# Patient Record
Sex: FEMALE | Race: WHITE | NOT HISPANIC OR LATINO | Employment: FULL TIME | URBAN - METROPOLITAN AREA
[De-identification: names, ages, dates, MRNs, and addresses within clinical notes are randomized per-mention and may not be internally consistent; named-entity substitution may affect disease eponyms.]

---

## 2019-04-27 ENCOUNTER — OFFICE VISIT (OUTPATIENT)
Dept: URGENT CARE | Facility: URGENT CARE | Age: 39
End: 2019-04-27
Payer: COMMERCIAL

## 2019-04-27 VITALS
HEART RATE: 107 BPM | TEMPERATURE: 102.6 F | BODY MASS INDEX: 26.14 KG/M2 | SYSTOLIC BLOOD PRESSURE: 123 MMHG | OXYGEN SATURATION: 100 % | WEIGHT: 177 LBS | DIASTOLIC BLOOD PRESSURE: 73 MMHG

## 2019-04-27 DIAGNOSIS — R50.9 FEVER IN ADULT: ICD-10-CM

## 2019-04-27 DIAGNOSIS — J06.9 VIRAL URI: Primary | ICD-10-CM

## 2019-04-27 LAB
FLUAV+FLUBV AG SPEC QL: NEGATIVE
FLUAV+FLUBV AG SPEC QL: NEGATIVE
SPECIMEN SOURCE: NORMAL

## 2019-04-27 PROCEDURE — 99203 OFFICE O/P NEW LOW 30 MIN: CPT | Performed by: PHYSICIAN ASSISTANT

## 2019-04-27 PROCEDURE — 87804 INFLUENZA ASSAY W/OPTIC: CPT | Performed by: FAMILY MEDICINE

## 2019-04-27 RX ORDER — AMITRIPTYLINE HYDROCHLORIDE 10 MG/1
20 TABLET ORAL DAILY
Refills: 4 | COMMUNITY
Start: 2019-03-26

## 2019-04-28 NOTE — PROGRESS NOTES
SUBJECTIVE:    Sofia Diaz to  today for evaluation of  fever (home T-Max 101.3 today), chills, nasal congestion, clear rhinorrhea and generalized body aches. No cough.     ROS:     HEENT: Positive nasal congestion with clear rhinorrhea.   RESP: Denies any cough, wheezing or SOB.   Denies any hx of asthma or RAD.   GI: Denies any N/V/D. No abdominal pain. Normal BM's  SKIN: Denies rash  NEURO:Denies any HA, neck stiffness or lethargy.     PMHX: Migraine HA     Past Medical History:   Diagnosis Date     Migraines        Current Outpatient Medications   Medication     amitriptyline (ELAVIL) 10 MG tablet     MAGNESIUM OXIDE PO     Prenatal Vit-Fe Fumarate-FA (PRENATAL MULTIVITAMIN  PLUS IRON) 27-0.8 MG TABS     No current facility-administered medications for this visit.        Allergies   Allergen Reactions     Lactose      PN: LW FI1: nka     Penicillins Hives       OBJECTIVE:  /73   Pulse 107   Temp 102.6  F (39.2  C) (Oral)   Wt 80.3 kg (177 lb)   SpO2 100%   Breastfeeding? No   BMI 26.14 kg/m      General appearance: alert and no apparent distress  Skin color is pink and without rash.  HEENT:   Conjunctiva not injected.  Sclera clear.  Left TM is normal: no effusions, no erythema, and normal landmarks.  Right TM is normal: no effusions, no erythema, and normal landmarks.  Nasal mucosa is congested  Oropharyngeal exam is positive for post-nasal drip. Uvula mid-line. No erythema.  No plaque, exudate, lesions, or ulcers.   Neck is supple, FROM. No pain or stiffness with ROM. No adenopathy  CARDIAC:NORMAL - regular rate and rhythm without murmur.  RESP: Normal - CTA without rales, rhonchi, or wheezing.  ABDOMEN: Abdomen soft, non-tender. BS normal. No masses, organomegaly  NEURO: Alert and oriented.  Normal speech and mentation.  CN II/XII grossly intact.  Gait within normal limits.        LAB:     Results for orders placed or performed in visit on 04/27/19   Influenza A/B antigen   Result  "Value Ref Range    Influenza A/B Agn Specimen Nasal     Influenza A Negative NEG^Negative    Influenza B Negative NEG^Negative           ASSESSMENT/PLAN:    (J06.9) Viral URI  (primary encounter diagnosis)  MDM: Acute onset nasal congestion, clear rhinorrhea, generalized body aches and fever x 2 days duration. Influenza testing negative here today. No ST and patient declines RST. No evidence of secondary bacterial infection.     Plan: Push fluids. Monitor fever and follow-up if increased fever or if fever fails to resolve over next 24-48 hours. Follow-up with PCP if sxs change, worsen or fail to resolve with home comfort care measures over the next 5-7 days.      In addition to the above, viral URI \"red flag\" signs and sxs are reviewed with patient. AVS declined.   Patient verbalizes understanding of and agrees to the above plan.       (R50.9) Fever  Plan: Influenza A/B antigen                "

## 2020-07-15 ENCOUNTER — OFFICE VISIT (OUTPATIENT)
Dept: URGENT CARE | Facility: URGENT CARE | Age: 40
End: 2020-07-15
Payer: COMMERCIAL

## 2020-07-15 VITALS
TEMPERATURE: 98.4 F | OXYGEN SATURATION: 100 % | HEART RATE: 81 BPM | WEIGHT: 176 LBS | DIASTOLIC BLOOD PRESSURE: 77 MMHG | BODY MASS INDEX: 25.99 KG/M2 | SYSTOLIC BLOOD PRESSURE: 113 MMHG

## 2020-07-15 DIAGNOSIS — J02.9 SORE THROAT: Primary | ICD-10-CM

## 2020-07-15 LAB
DEPRECATED S PYO AG THROAT QL EIA: NEGATIVE
SPECIMEN SOURCE: NORMAL

## 2020-07-15 PROCEDURE — 40001204 ZZHCL STATISTIC STREP A RAPID: Performed by: NURSE PRACTITIONER

## 2020-07-15 PROCEDURE — 99213 OFFICE O/P EST LOW 20 MIN: CPT | Performed by: NURSE PRACTITIONER

## 2020-07-15 PROCEDURE — 87651 STREP A DNA AMP PROBE: CPT | Performed by: NURSE PRACTITIONER

## 2020-07-15 ASSESSMENT — ENCOUNTER SYMPTOMS
HEADACHES: 0
RHINORRHEA: 1
VOMITING: 0
CHILLS: 0
FEVER: 0
FATIGUE: 1
DIAPHORESIS: 0
VOICE CHANGE: 0
SHORTNESS OF BREATH: 0
SLEEP DISTURBANCE: 0
MYALGIAS: 0
LIGHT-HEADEDNESS: 0
SORE THROAT: 1
COUGH: 0
TROUBLE SWALLOWING: 0
CHEST TIGHTNESS: 0
NAUSEA: 0
WHEEZING: 0
ADENOPATHY: 1

## 2020-07-15 NOTE — PROGRESS NOTES
Chief Complaint   Patient presents with     Urgent Care     Pharyngitis     start 1-2 weeks sx sore throat hx daughter tested positive for strep today tx none      SUBJECTIVE:  Sofia Diaz is a 40 year old female presenting with a chief complaint of a sore throat, slight runny nose and post nasal drip.  Onset of symptoms was 2 week(s) ago.  Course of illness: gradual onset, coming and going.  Severity: mild  Current and Associated symptoms: none.  Treatment measures tried include: None tried.  Predisposing factors include: daughter just tested positive for strep yesterday. Patient does not recall a history of strep throat.    Past Medical History:   Diagnosis Date     Migraines      amitriptyline (ELAVIL) 10 MG tablet, Take 20 mg by mouth daily  levonorgestrel (MIRENA, 52 MG,) 20 MCG/24HR IUD, place by Intrauterine route  every 5 years  MAGNESIUM OXIDE PO,   Prenatal Vit-Fe Fumarate-FA (PRENATAL MULTIVITAMIN  PLUS IRON) 27-0.8 MG TABS, Take 1 tablet by mouth daily    No current facility-administered medications on file prior to visit.     Social History     Tobacco Use     Smoking status: Never Smoker     Smokeless tobacco: Never Used   Substance Use Topics     Alcohol use: Yes     Comment: rare     Allergies   Allergen Reactions     Lactose      PN: LW FI1: nka     Penicillins Hives     Review of Systems   Constitutional: Positive for fatigue (not new). Negative for chills, diaphoresis and fever.   HENT: Positive for postnasal drip, rhinorrhea and sore throat. Negative for congestion, ear pain, trouble swallowing and voice change.    Respiratory: Negative for cough, chest tightness, shortness of breath and wheezing.    Gastrointestinal: Negative for nausea and vomiting.   Musculoskeletal: Negative for myalgias.   Skin: Negative for rash.   Neurological: Negative for light-headedness and headaches.   Hematological: Positive for adenopathy.   Psychiatric/Behavioral: Negative for sleep disturbance.     OBJECTIVE:    /77   Pulse 81   Temp 98.4  F (36.9  C)   Wt 79.8 kg (176 lb)   SpO2 100%   BMI 25.99 kg/m       Physical Exam  Constitutional:       General: She is not in acute distress.     Appearance: She is well-developed. She is not ill-appearing, toxic-appearing or diaphoretic.   HENT:      Head: Normocephalic and atraumatic.      Nose: Nose normal.      Mouth/Throat:      Mouth: Mucous membranes are moist.      Pharynx: Posterior oropharyngeal erythema (slight) present. No oropharyngeal exudate.      Comments: Tonsils flat almost appear absent.  Eyes:      Conjunctiva/sclera: Conjunctivae normal.      Pupils: Pupils are equal, round, and reactive to light.   Neck:      Musculoskeletal: Normal range of motion and neck supple.   Cardiovascular:      Rate and Rhythm: Normal rate.   Pulmonary:      Effort: Pulmonary effort is normal. No respiratory distress.   Musculoskeletal: Normal range of motion.   Lymphadenopathy:      Cervical: Cervical adenopathy present.   Skin:     General: Skin is warm.      Capillary Refill: Capillary refill takes less than 2 seconds.      Findings: No erythema or rash.   Neurological:      General: No focal deficit present.      Mental Status: She is alert and oriented to person, place, and time.   Psychiatric:         Mood and Affect: Mood normal.         Behavior: Behavior normal.       Rapid Strep test is negative; await throat culture results.    ASSESSMENT:    ICD-10-CM    1. Sore throat  J02.9 Streptococcus A Rapid Scr w Reflx to PCR     Group A Streptococcus PCR Throat Swab     PLAN:   Patient Instructions   Rapid strep test today is negative.  Likely seasonal allergic post nasal drip or viral pharyngitis that should subside.  Your throat culture is pending. Urgent Care will call if positive results to start antibiotics at that time; No call if the culture is negative.  Patient wants to hold on covid testing for now. Advised oncare if this lingers and she decides on curbside  testing.  Drink plenty of fluids and rest.  May use salt water gargles- about 8 oz warm water with about 1 teaspoon salt  Sucrets and Cepacol spray are over the counter medications that numb the throat.  Over the counter pain relievers such as tylenol or ibuprofen may be used as needed.  Antihistamines such as zyrtec and benadryl for post nasal drip.   Honey has been shown to be helpful in cough management and is soothing to a sore throat. May add to lemon tea.  Please follow up with primary care provider if not improving, worsening or new symptoms.    Follow up with primary care provider with any problems, questions or concerns or if symptoms worsen or fail to improve. Patient agreed to plan and verbalized understanding.    TIFFANIE [facility] URGENT CARE COURTNEY

## 2020-07-15 NOTE — PATIENT INSTRUCTIONS
Rapid strep test today is negative.  Likely seasonal allergic post nasal drip or viral pharyngitis that should subside.  Your throat culture is pending. Urgent Care will call if positive results to start antibiotics at that time; No call if the culture is negative.  Patient wants to hold on covid testing for now. Advised oncare if this lingers and she decides on curbside testing.  Drink plenty of fluids and rest.  May use salt water gargles- about 8 oz warm water with about 1 teaspoon salt  Sucrets and Cepacol spray are over the counter medications that numb the throat.  Over the counter pain relievers such as tylenol or ibuprofen may be used as needed.  Antihistamines such as zyrtec and benadryl for post nasal drip.   Honey has been shown to be helpful in cough management and is soothing to a sore throat. May add to lemon tea.  Please follow up with primary care provider if not improving, worsening or new symptoms.

## 2020-07-16 LAB
SPECIMEN SOURCE: NORMAL
STREP GROUP A PCR: NOT DETECTED

## 2021-11-21 ENCOUNTER — HOSPITAL ENCOUNTER (EMERGENCY)
Facility: CLINIC | Age: 41
Discharge: HOME | End: 2021-11-21
Attending: EMERGENCY MEDICINE | Admitting: EMERGENCY MEDICINE
Payer: COMMERCIAL

## 2021-11-21 VITALS
BODY MASS INDEX: 26.66 KG/M2 | WEIGHT: 180 LBS | RESPIRATION RATE: 16 BRPM | SYSTOLIC BLOOD PRESSURE: 127 MMHG | DIASTOLIC BLOOD PRESSURE: 91 MMHG | OXYGEN SATURATION: 99 % | HEART RATE: 74 BPM | HEIGHT: 69 IN | TEMPERATURE: 98.4 F

## 2021-11-21 DIAGNOSIS — R33.8 ACUTE URINARY RETENTION: ICD-10-CM

## 2021-11-21 LAB
ALBUMIN UR-MCNC: NEGATIVE MG/DL
ANION GAP SERPL CALCULATED.3IONS-SCNC: 4 MMOL/L (ref 3–14)
APPEARANCE UR: CLEAR
BILIRUB UR QL STRIP: NEGATIVE
BUN SERPL-MCNC: 15 MG/DL (ref 7–30)
CALCIUM SERPL-MCNC: 8.6 MG/DL (ref 8.5–10.1)
CHLORIDE BLD-SCNC: 110 MMOL/L (ref 94–109)
CO2 SERPL-SCNC: 26 MMOL/L (ref 20–32)
COLOR UR AUTO: ABNORMAL
CREAT SERPL-MCNC: 0.88 MG/DL (ref 0.52–1.04)
GFR SERPL CREATININE-BSD FRML MDRD: 82 ML/MIN/1.73M2
GLUCOSE BLD-MCNC: 93 MG/DL (ref 70–99)
GLUCOSE UR STRIP-MCNC: NEGATIVE MG/DL
HCG UR QL: NEGATIVE
HGB UR QL STRIP: NEGATIVE
KETONES UR STRIP-MCNC: NEGATIVE MG/DL
LEUKOCYTE ESTERASE UR QL STRIP: NEGATIVE
MUCOUS THREADS #/AREA URNS LPF: PRESENT /LPF
NITRATE UR QL: NEGATIVE
PH UR STRIP: 6.5 [PH] (ref 5–7)
POTASSIUM BLD-SCNC: 4.2 MMOL/L (ref 3.4–5.3)
RBC URINE: 1 /HPF
SODIUM SERPL-SCNC: 140 MMOL/L (ref 133–144)
SP GR UR STRIP: 1.02 (ref 1–1.03)
UROBILINOGEN UR STRIP-MCNC: NORMAL MG/DL
WBC URINE: 0 /HPF

## 2021-11-21 PROCEDURE — 51702 INSERT TEMP BLADDER CATH: CPT

## 2021-11-21 PROCEDURE — 80048 BASIC METABOLIC PNL TOTAL CA: CPT | Performed by: EMERGENCY MEDICINE

## 2021-11-21 PROCEDURE — 99283 EMERGENCY DEPT VISIT LOW MDM: CPT

## 2021-11-21 PROCEDURE — 81025 URINE PREGNANCY TEST: CPT | Performed by: EMERGENCY MEDICINE

## 2021-11-21 PROCEDURE — 81001 URINALYSIS AUTO W/SCOPE: CPT | Performed by: EMERGENCY MEDICINE

## 2021-11-21 PROCEDURE — 36415 COLL VENOUS BLD VENIPUNCTURE: CPT | Performed by: EMERGENCY MEDICINE

## 2021-11-21 RX ORDER — NITROFURANTOIN 25; 75 MG/1; MG/1
100 CAPSULE ORAL 2 TIMES DAILY
Qty: 10 CAPSULE | Refills: 0 | Status: SHIPPED | OUTPATIENT
Start: 2021-11-21 | End: 2021-11-26

## 2021-11-21 ASSESSMENT — ENCOUNTER SYMPTOMS
ABDOMINAL PAIN: 0
DIFFICULTY URINATING: 1
FREQUENCY: 1
BACK PAIN: 0
HEMATURIA: 1
CONSTIPATION: 1
FEVER: 0
CHILLS: 0
ABDOMINAL DISTENTION: 0

## 2021-11-21 ASSESSMENT — MIFFLIN-ST. JEOR: SCORE: 1550.85

## 2021-11-21 NOTE — ED TRIAGE NOTES
Pt had bladder sling surgery on 11/12. Pt woke up this morning unable to urinate. Pt had a little difficulty yesterday as well. Pt having some pressure, denies pain.

## 2021-11-21 NOTE — ED PROVIDER NOTES
History   Chief Complaint:  Urinary Retention     The history is provided by the patient.      Sofia Diaz is a 41 year old female with history of overactive bladder and stress incontinence, s/p internal bladder sling placement on , who presents with increased urgency and difficulty urinating that started last night and increased this morning to the point where she was only able to void a few dribbles of urine. Reports some abdominal pressure, but denies significant abdominal pain or distension. Reports hematuria, but believes this is secondary to her recent bladder surgery. She has chronic constipation. Denies fever, chills and low back pain. No radiation of pain down legs nor associated leg weakness/numbness. She notes that she was recovering well following the surgery and was asymptomatic until last night.    Review of Systems   Constitutional: Negative for chills and fever.   Gastrointestinal: Positive for constipation (chronic). Negative for abdominal distention and abdominal pain.   Genitourinary: Positive for decreased urine volume, difficulty urinating, frequency, hematuria and urgency.   Musculoskeletal: Negative for back pain.   All other systems reviewed and are negative.    Allergies:  Lactose  Penicillins  Sulfa Antibiotics  Tetracycline    Medications:  Myrbetriq  Detrol LA  Prilosec  Elavil    Past Medical History:     Overactive bladder  Stress incontinence  Migraines  Simple goiter    Hemorrhage of rectum and anus  Hemorrhoids  Schatzki's ring  TMJ syndrome  Basal cell carcinoma  Eustachian tube dysfunction    Past Surgical History:     section  DaVinci laparoscopic cholecystectomy     Family History:    Multiple: hypertension, basal cell carcinoma  Sister: anxiety, depression    Social History:  Presents to the ED alone.  Follows up with Dr. James Grant at [facility] in [place].    Physical Exam     Patient Vitals for the past 24 hrs:   BP Temp Temp src Pulse Resp SpO2  "Height Weight   11/21/21 1100 (!) 127/91 -- -- 74 -- 99 % -- --   11/21/21 1045 (!) 120/91 -- -- 71 -- 97 % -- --   11/21/21 1030 (!) 119/96 -- -- 76 -- 98 % -- --   11/21/21 1015 110/82 -- -- 75 -- 99 % -- --   11/21/21 0919 (!) 161/106 98.4  F (36.9  C) Oral 94 16 98 % 1.753 m (5' 9\") 81.6 kg (180 lb)       Physical Exam  General:              Well-nourished              Speaking in full sentences  Eyes:              Conjunctiva without injection or scleral icterus  ENT:              Moist mucous membranes              Nares patent              Pinnae normal  Neck:              Full ROM              No stiffness appreciated  Resp:              Lungs CTAB              No crackles, wheezing or audible rubs              Good air movement  CV:                    Normal rate, regular rhythm              S1 and S2 present              No murmur, gallop or rub  GI:              BS present              Abdomen soft without distention              Non-tender to light and deep palpation              No guarding or rebound tenderness  Skin:              Warm, dry, well perfused              No rashes or open wounds on exposed skin              Well healed surgical scar to lower supra-pubic region  MSK:              Moves all extremities              No focal deformities or swelling  Neuro:              Alert              Answers questions appropriately              Moves all extremities equally              Gait stable  Psych:              Normal affect, normal mood    Emergency Department Course     Laboratory:  BMP: Chloride: 110 (H) o/w WNL (Creatinine: 0.88)    UA with Microscopic: Mucus: Present (A) o/w Negative     HCG qualitative Urine: Negative.    Emergency Department Course:  Reviewed:  I reviewed nursing notes, vitals, past medical history, Care Everywhere and [facility]    Assessments:  0938 I obtained history and examined the patient as noted above.   1101 I rechecked the patient and explained lab results. Repeat " abdominal exam is soft and non-tender. Informed her of plan after speaking with Dr. Fitzpatrick as noted below.     Consults:  1052 I spoke with Dr. Fitzpatrick, at [facility] [place], regarding patient's presentation and progress. He recommended discharge with medication and follow up tomorrow in clinic.    Disposition:  The patient was discharged to home.     Impression & Plan     CMS Diagnoses: None    Medical Decision Making:  Sofia Diaz is a very pleasant 41-year-old female presenting to the ED for evaluation of urinary retention. VS on presentation were unremarkable. Evaluation is consistent with acute urinary retention, likely secondary to postoperative inflammation. In discussion with Dr. Fitzpatrick of OB/GYN, this most likely is obstructing flow of urine through the urethra, resulting in urinary retention. Fowler catheter was placed, with greater than 800 cc of urine output. Patient is feeling less pressure with this in place. UA is without evidence of superimposed infection, and metabolic function is within normal renal function. In discussion with OB/GYN, they felt procedure would unlikely result in complication such as bladder perforation (cystoscopy used intraoperatively), nor intra-abdominal abscess. Given her essential resolution and discomfort following Fowler catheter placement, as well as reassuring exam, I feel that further advanced imaging with CT can be deferred safely and risks of radiation/contrast exposure outweigh benefits. Additionally, no low back pain, leg pain, leg weakness, numbness argue against spinal cord pathology such as cauda equina/conus medullaris syndrome warranting spinal MRI. Patient will resume ibuprofen for anti-inflammatory effect with hope of aiding in resolution of swelling and will be started on Macrobid at the recommendation of OB/GYN. She is to contact the clinic tomorrow to be seen earlier this week before the holiday for a voiding trial and hopeful removal of the catheter. She feels  comfortable with outlined plan of care. Questions answered prior to discharge.   Diagnosis:    ICD-10-CM    1. Acute urinary retention  R33.8      Discharge Medications:  Discharge Medication List as of 11/21/2021 11:16 AM      START taking these medications    Details   nitroFURantoin macrocrystal-monohydrate (MACROBID) 100 MG capsule Take 1 capsule (100 mg) by mouth 2 times daily for 5 days, Disp-10 capsule, R-0, E-Prescribe             Scribe Disclosure:  I, Darcie Amaro, am serving as a scribe at 9:30 AM on 11/21/2021 to document services personally performed by Jan Mehta MD based on my observations and the provider's statements to me.            Jan Mehta MD  11/21/21 [id number]

## 2021-11-21 NOTE — DISCHARGE INSTRUCTIONS
Please contact your OB/GYN clinic tomorrow for a follow-up appointment for a voiding trial    Keep catheter in place until seen in follow-up    Begin Macrobid (antibiotic while catheter is in place)    Continue with ibuprofen for anti-inflammatory effect

## 2023-02-17 ENCOUNTER — LAB REQUISITION (OUTPATIENT)
Dept: LAB | Facility: CLINIC | Age: 43
End: 2023-02-17

## 2023-02-17 DIAGNOSIS — Z01.419 ENCOUNTER FOR GYNECOLOGICAL EXAMINATION (GENERAL) (ROUTINE) WITHOUT ABNORMAL FINDINGS: ICD-10-CM

## 2023-02-17 LAB
CHOLEST SERPL-MCNC: 174 MG/DL
HDLC SERPL-MCNC: 47 MG/DL
LDLC SERPL CALC-MCNC: 90 MG/DL
NONHDLC SERPL-MCNC: 127 MG/DL
TRIGL SERPL-MCNC: 187 MG/DL

## 2023-02-17 PROCEDURE — 80061 LIPID PANEL: CPT | Performed by: OBSTETRICS & GYNECOLOGY

## 2023-02-17 PROCEDURE — 83036 HEMOGLOBIN GLYCOSYLATED A1C: CPT | Performed by: OBSTETRICS & GYNECOLOGY

## 2023-02-21 LAB — HBA1C MFR BLD: 5.3 %

## 2024-02-23 ENCOUNTER — LAB REQUISITION (OUTPATIENT)
Dept: LAB | Facility: CLINIC | Age: 44
End: 2024-02-23

## 2024-02-23 DIAGNOSIS — Z01.419 ENCOUNTER FOR GYNECOLOGICAL EXAMINATION (GENERAL) (ROUTINE) WITHOUT ABNORMAL FINDINGS: ICD-10-CM

## 2024-02-23 PROCEDURE — G0145 SCR C/V CYTO,THINLAYER,RESCR: HCPCS | Performed by: OBSTETRICS & GYNECOLOGY

## 2024-02-23 PROCEDURE — 87624 HPV HI-RISK TYP POOLED RSLT: CPT | Performed by: OBSTETRICS & GYNECOLOGY

## 2024-02-28 LAB
BKR LAB AP GYN ADEQUACY: NORMAL
BKR LAB AP GYN INTERPRETATION: NORMAL
BKR LAB AP HPV REFLEX: NORMAL
BKR LAB AP LMP: NORMAL
BKR LAB AP PREVIOUS ABNL DX: NORMAL
BKR LAB AP PREVIOUS ABNORMAL: NORMAL
PATH REPORT.COMMENTS IMP SPEC: NORMAL
PATH REPORT.COMMENTS IMP SPEC: NORMAL
PATH REPORT.RELEVANT HX SPEC: NORMAL

## 2024-02-29 LAB
HUMAN PAPILLOMA VIRUS 16 DNA: NEGATIVE
HUMAN PAPILLOMA VIRUS 18 DNA: NEGATIVE
HUMAN PAPILLOMA VIRUS FINAL DIAGNOSIS: NORMAL
HUMAN PAPILLOMA VIRUS OTHER HR: NEGATIVE

## 2024-09-18 ENCOUNTER — TRANSCRIBE ORDERS (OUTPATIENT)
Dept: OTHER | Age: 44
End: 2024-09-18

## 2024-09-18 DIAGNOSIS — G47.33 OSA (OBSTRUCTIVE SLEEP APNEA): Primary | ICD-10-CM

## 2024-09-20 ENCOUNTER — TELEPHONE (OUTPATIENT)
Dept: SLEEP MEDICINE | Facility: CLINIC | Age: 44
End: 2024-09-20
Payer: COMMERCIAL

## 2024-09-20 NOTE — TELEPHONE ENCOUNTER
Reason for Call:  Appointment Request    Patient requesting this type of appt:  sleep eval    Requested provider:  mack    Reason patient unable to be scheduled: Not within requested timeframe    When does patient want to be seen/preferred time:  sooner than scheduled    Comments:     Okay to leave a detailed message?: Yes at Cell number on file:    Telephone Information:   Mobile [phone]       Call taken on 9/20/2024 at 4:22 PM by Irina Colvin

## 2024-09-25 NOTE — TELEPHONE ENCOUNTER
Chart reviewed. Referral is for routine consult. Patient is scheduled first available appointment and on wait list. Clinic will reach out if sooner appointment becomes available.     Tabby ISIDRO RN  [facility]

## 2024-12-29 PROBLEM — Z97.5 USES INTRAUTERINE DEVICE FOR BIRTH CONTROL: Status: ACTIVE | Noted: 2019-02-05

## 2024-12-29 PROBLEM — K22.2 LOWER ESOPHAGEAL RING: Status: ACTIVE | Noted: 2021-02-09

## 2024-12-29 PROBLEM — R09.A2 GLOBUS SENSATION: Status: ACTIVE | Noted: 2024-12-29

## 2024-12-29 PROBLEM — K62.5 HEMORRHAGE OF RECTUM AND ANUS: Status: ACTIVE | Noted: 2017-04-24

## 2024-12-31 ENCOUNTER — VIRTUAL VISIT (OUTPATIENT)
Dept: SLEEP MEDICINE | Facility: CLINIC | Age: 44
End: 2024-12-31
Attending: DENTIST
Payer: COMMERCIAL

## 2024-12-31 VITALS — WEIGHT: 180 LBS | BODY MASS INDEX: 26.66 KG/M2 | HEIGHT: 69 IN

## 2024-12-31 DIAGNOSIS — R53.83 MALAISE AND FATIGUE: ICD-10-CM

## 2024-12-31 DIAGNOSIS — Z72.820 LACK OF ADEQUATE SLEEP: ICD-10-CM

## 2024-12-31 DIAGNOSIS — G47.09 OTHER INSOMNIA: Primary | Chronic | ICD-10-CM

## 2024-12-31 DIAGNOSIS — R53.81 MALAISE AND FATIGUE: ICD-10-CM

## 2024-12-31 DIAGNOSIS — R06.89 DYSPNEA AND RESPIRATORY ABNORMALITY: ICD-10-CM

## 2024-12-31 DIAGNOSIS — R06.00 DYSPNEA AND RESPIRATORY ABNORMALITY: ICD-10-CM

## 2024-12-31 DIAGNOSIS — F45.8 BRUXISM: ICD-10-CM

## 2024-12-31 DIAGNOSIS — R51.9 MORNING HEADACHE: ICD-10-CM

## 2024-12-31 PROBLEM — G47.00 INSOMNIA: Chronic | Status: ACTIVE | Noted: 2024-12-31

## 2024-12-31 PROBLEM — K22.2 SCHATZKI'S RING: Status: ACTIVE | Noted: 2021-02-09

## 2024-12-31 PROBLEM — K62.5 HEMORRHAGE OF RECTUM AND ANUS: Status: RESOLVED | Noted: 2017-04-24 | Resolved: 2024-12-31

## 2024-12-31 PROBLEM — K22.2 SCHATZKI'S RING: Chronic | Status: ACTIVE | Noted: 2021-02-09

## 2024-12-31 ASSESSMENT — SLEEP AND FATIGUE QUESTIONNAIRES
HOW LIKELY ARE YOU TO NOD OFF OR FALL ASLEEP WHILE SITTING QUIETLY AFTER LUNCH WITHOUT ALCOHOL: WOULD NEVER DOZE
HOW LIKELY ARE YOU TO NOD OFF OR FALL ASLEEP WHEN YOU ARE A PASSENGER IN A CAR FOR AN HOUR WITHOUT A BREAK: MODERATE CHANCE OF DOZING
HOW LIKELY ARE YOU TO NOD OFF OR FALL ASLEEP WHILE SITTING INACTIVE IN A PUBLIC PLACE: WOULD NEVER DOZE
HOW LIKELY ARE YOU TO NOD OFF OR FALL ASLEEP WHILE WATCHING TV: SLIGHT CHANCE OF DOZING
HOW LIKELY ARE YOU TO NOD OFF OR FALL ASLEEP WHILE SITTING AND TALKING TO SOMEONE: WOULD NEVER DOZE
HOW LIKELY ARE YOU TO NOD OFF OR FALL ASLEEP WHILE LYING DOWN TO REST IN THE AFTERNOON WHEN CIRCUMSTANCES PERMIT: HIGH CHANCE OF DOZING
HOW LIKELY ARE YOU TO NOD OFF OR FALL ASLEEP IN A CAR, WHILE STOPPED FOR A FEW MINUTES IN TRAFFIC: WOULD NEVER DOZE
HOW LIKELY ARE YOU TO NOD OFF OR FALL ASLEEP WHILE SITTING AND READING: WOULD NEVER DOZE

## 2024-12-31 ASSESSMENT — PAIN SCALES - GENERAL: PAINLEVEL_OUTOF10: NO PAIN (0)

## 2024-12-31 NOTE — PROGRESS NOTES
Virtual Visit Details    Type of service:  Video Visit     Originating Location (pt. Location): Home    Distant Location (provider location):  On-site  Platform used for Video Visit: [facility]    Outpatient Sleep Medicine Consultation:      Name: Sofia Diaz MRN# [id number]   Age: 44 year old YOB: 1980     Date of Consultation: December 31, 2024  Consultation is requested by: [facility]  [address]  [place] [address] Mikael Zavala  Primary care provider: No Ref-Primary, Physician       Reason for Sleep Consult:     Sofia Diaz is sent by Mikael Zavala for a sleep consultation regarding sleep apnea.    Patient s Reason for visit  Sofia Diaz main reason for visit:  Grinding teeth and possible sleep disordered breathing   Patient states problem(s) started:  20 years  Sofia COYNE Joe's goals for this visit:  Sleep study           Assessment and Plan:       Summary Sleep Diagnoses & Recommendations:   Patient has features and risk factors for possible obstructive sleep apnea including: loud snoring, bruxism, non-refreshing sleep, morning headaches, daytime fatigue/sleepiness (ESS 6) and difficulty maintaining sleep. The STOP-BANG score is 2/8. The pathophysiology, diagnosis and treatment of CHAR was discussed. Will proceed with Polysomnogram (using 4% desaturation/Medicare/ AASM 1B scoring rules) to evaluate for obstructive sleep apnea.  Patient is a poor candidate for Home Sleep Testing due to symptoms of CHAR but low pre-test probability of CHAR  (STOPBANG 0-2).    Chronic insomnia: Patient  reports doing well on Amitriptyline. The patient has experienced improvement in sleep initiation and maintenance without significant side effects from the medication. Introduced CBT-I as an evidence-based, non-pharmacological intervention for long-term management of chronic insomnia and she will consider this in the future.    Recommend weight  management.         Summary Recommendations:  Orders Placed This Encounter   Procedures    Comprehensive Sleep Study       Summary Counseling:    Sleep Testing Reviewed  Obstructive Sleep Apnea Reviewed  Complications of Untreated Sleep Apnea Reviewed    Medical Decision-making:   Educational materials provided in instructions    Total time spent reviewing medical records, history and physical examination, review of previous testing and interpretation as well as documentation on this date:50 minutes    CC: Mikael Zavala          History of Present Illness:     Past Sleep Evaluations:    SLEEP-WAKE SCHEDULE:     Work/School Days: Patient goes to school/work:  yes  Usually gets into bed at  1030 pm  Takes patient about   20 minutes to fall asleep  Has trouble falling asleep   0-1 nights per week  Wakes up in the middle of the night   0-1 times.  Wakes up due to  uncertain reasons, headaches.   She has trouble falling back asleep   times a week.   It usually takes  1-120 minutes to get back to sleep  Patient is usually up at  6:30 am  Uses alarm:  yes    Weekends/Non-work Days/All Other Days:  Usually gets into bed at   11 pm  Takes patient about   20 minutes to fall asleep  Patient is usually up at  8 am  Uses alarm:  no    Sleep Need  Patient gets   8 hours of  sleep on average   Patient thinks she needs about   8 sleep    Sofia Middletontis prefers to sleep in this position(s):   sides and back  Patient states they do the following activities in bed:  TV and electronics in bed.     Naps  Patient takes a purposeful nap  0 times a week and naps are usually   in duration  She feels better after a nap:    She dozes off unintentionally 0  days per week  Patient has had a driving accident or near-miss due to sleepiness/drowsiness:  no      SLEEP DISRUPTIONS:    Breathing/Snoring  Patient snores: yes  Other people complain about her snoring:  yes  Patient has been told she stops breathing in her sleep: no  She has issues  with the following:      Movement:  Patient gets pain, discomfort, with an urge to move:   no  It happens when she is resting:     It happens more at night:     Patient has been told she kicks her legs at night:   no     Behaviors in Sleep:  Sofia Diaz has experienced the following behaviors while sleeping:  Bruxism  She has experienced sudden muscle weakness during the day:        Is there anything else you would like your sleep provider to know:        CAFFEINE AND OTHER SUBSTANCES:    Patient consumes caffeinated beverages per day:   0  Last caffeine use is usually:    List of any prescribed or over the counter stimulants that patient takes:    List of any prescribed or over the counter sleep medication patient takes:    List of previous sleep medications that patient has tried:  Amitriptyline   Patient drinks alcohol to help them sleep:  no  Patient drinks alcohol near bedtime:  no    Family History:  Patient has a family member been diagnosed with a sleep disorder:  no            SCALES:    EPWORTH SLEEPINESS SCALE         12/31/2024     9:05 AM    Roberts Sleepiness Scale ( PB [place]  [id number]<br>ESS - [place]/English - Final version - 21 Nov 07 - [facility] [place].)   Sitting and reading Would never doze   Watching TV Slight chance of dozing   Sitting, inactive in a public place (e.g. a theatre or a meeting) Would never doze   As a passenger in a car for an hour without a break Moderate chance of dozing   Lying down to rest in the afternoon when circumstances permit High chance of dozing   Sitting and talking to someone Would never doze   Sitting quietly after a lunch without alcohol Would never doze   In a car, while stopped for a few minutes in traffic Would never doze   Roberts Score (MC) 6   Roberts Score (Sleep) 6        Proxy-reported         INSOMNIA SEVERITY INDEX (COLTON)          12/31/2024     9:04 AM   Insomnia Severity Index (COLTON)   Difficulty falling asleep 2    Difficulty staying asleep 2  "   Problems waking up too early 0    How SATISFIED/DISSATISFIED are you with your CURRENT sleep pattern? 2    How NOTICEABLE to others do you think your sleep problem is in terms of impairing the quality of your life? 1    How WORRIED/DISTRESSED are you about your current sleep problem? 2    To what extent do you consider your sleep problem to INTERFERE with your daily functioning (e.g. daytime fatigue, mood, ability to function at work/daily chores, concentration, memory, mood, etc.) CURRENTLY? 1    COLTON Total Score 10        Proxy-reported       Guidelines for Scoring/Interpretation:  Total score categories:  0-7 = No clinically significant insomnia   8-14 = Subthreshold insomnia   15-21 = Clinical insomnia (moderate severity)  22-28 = Clinical insomnia (severe)  Used via courtesy of www.Amonixth.va.gov with permission from Carlos Senior PhD., [facility]      STOP BANG         12/31/2024     9:02 AM   STOP BANG Questionnaire (  2008, the American Society of Anesthesiologists, Inc. [employer]   B/P Clinic: --   BMI Clinic: 26.58         GAD7         No data to display                  CAGE-AID         No data to display                CAGE-AID reprinted with permission from the [place] Medical Journal, AMANDA Alcaraz and ARTHUR Castillo, \"Conjoint screening questionnaires for alcohol and drug abuse\" [place] Medical Journal 94: 135-140, 1995.      PATIENT HEALTH QUESTIONNAIRE-9 (PHQ - 9)         No data to display                Developed by Gaston Louise, Tete Flores, Sesar Rosado and colleagues, with an educational louann from [employer] No permission required to reproduce, translate, display or distribute.        Allergies:    Allergies   Allergen Reactions    Lactose      PN: LW FI1: nka    Penicillins Hives       Medications:    Current Outpatient Medications   Medication Sig Dispense Refill    amitriptyline (ELAVIL) 10 MG tablet Take 20 mg by mouth daily  4    " levonorgestrel (MIRENA, 52 MG,) 20 MCG/24HR IUD place by Intrauterine route  every 5 years      MAGNESIUM OXIDE PO       Prenatal Vit-Fe Fumarate-FA (PRENATAL MULTIVITAMIN  PLUS IRON) 27-0.8 MG TABS Take 1 tablet by mouth daily         Problem List:  Patient Active Problem List    Diagnosis Date Noted    Insomnia 2024     Priority: Medium    Globus sensation 2024     Priority: Medium    Lower esophageal ring 2021     Priority: Medium     Dilatation procedure several years ago.        Schatzki's ring 2021     Priority: Medium     Dilatation procedure several years ago.      Uses intrauterine device for birth control 2019     Priority: Medium    Internal hemorrhoids 2015     Priority: Medium    TMJ syndrome 2012     Priority: Medium    Constipation 2009     Priority: Medium     Constipation  NOS      Dysfunction of eustachian tube 2009     Priority: Medium     Eustachian Tube Dysfunction      Nontoxic multinodular goiter 2006     Priority: Medium     LW Onset:  [id number]    ; Goiter Multinodular Nontoxic          Past Medical/Surgical History:  Past Medical History:   Diagnosis Date    Hemorrhage of rectum and anus 2017    Migraines      Past Surgical History:   Procedure Laterality Date    ABDOMEN SURGERY          DAVINCI LAPAROSCOPIC CHOLECYSTECTOMY WITHOUT GRAMS N/A 2014    Procedure: DAVINCI LAPAROSCOPIC CHOLECYSTECTOMY WITHOUT GRAMS;  Surgeon: Jacob Jefferson MD;  Location:  OR       Social History:  Social History     Socioeconomic History    Marital status:      Spouse name: Not on file    Number of children: 0    Years of education: 18    Highest education level: Not on file   Occupational History    Occupation: Social Work   Tobacco Use    Smoking status: Never    Smokeless tobacco: Never   Substance and Sexual Activity    Alcohol use: Yes     Comment: rare    Drug use: No    Sexual activity: Not on file   Other Topics  "Concern    Not on file   Social History Narrative    Not on file     Social Drivers of Health     Financial Resource Strain: High Risk (1/1/2022)    Received from [facility]    Financial Resource Strain     Difficulty of Paying Living Expenses: Not on file     Difficulty of Paying Living Expenses: Not on file   Food Insecurity: Not on file   Transportation Needs: Not on file   Physical Activity: Not on file   Stress: Not on file   Social Connections: Unknown (1/1/2022)    Received from [facility]    Social Connections     Frequency of Communication with Friends and Family: Not on file   Interpersonal Safety: Not on file   Housing Stability: Not on file       Family History:  Family History   Problem Relation Age of Onset    Diabetes Paternal Grandfather     Hypertension Father     Hypertension Sister        Review of Systems:  A complete review of systems reviewed by me is negative with the exeption of what has been mentioned in the history of present illness.        Physical Examination:  Vitals: Ht 1.753 m (5' 9\")   Wt 81.6 kg (180 lb)   BMI 26.58 kg/m    BMI= Body mass index is 26.58 kg/m .           GENERAL APPEARANCE: alert and no distress  EYES: Eyes grossly normal to inspection  NECK: no asymmetry, masses, or scars  RESP: breathing is non-labored   NEURO: mentation intact and speech normal  PSYCH: affect normal/bright  Mallampati Class:   Tonsillar Stage:          Data: All pertinent previous laboratory data reviewed     Recent Labs   Lab Test 11/21/21  1010      POTASSIUM 4.2   CHLORIDE 110*   CO2 26   ANIONGAP 4   GLC 93   BUN 15   CR 0.88   YOCASTA 8.6       TSH (mU/L)   Date Value   03/01/2005 1.08           MIGUEL Pedroza 12/31/2024           "

## 2024-12-31 NOTE — PATIENT INSTRUCTIONS
"          MY TREATMENT INFORMATION FOR SLEEP APNEA-  Sofia Diaz    DOCTOR : MIGUEL Pedroza    Am I having a sleep study at a sleep center?  --->Due to normal delays, you will be contacted within 2-4 weeks to schedule    Am I having a home sleep study?  --->Watch the video for the device you are using:    -/drop off device-   https://www.Dine Marketube.com/watch?v=yGGFBdELGhk    -Disposable device sent out require phone/computer application-   https://www.Callvine.com/watch?v=BCce_vbiwxE      Frequently asked questions:  1. What is Obstructive Sleep Apnea (CHAR)? CHAR is the most common type of sleep apnea. Apnea means, \"without breath.\"  Apnea is most often caused by narrowing or collapse of the upper airway as muscles relax during sleep.   Almost everyone has occasional apneas. Most people with sleep apnea have had brief interruptions at night frequently for many years.  The severity of sleep apnea is related to how frequent and severe the events are.   2. What are the consequences of CHAR? Symptoms include: feeling sleepy during the day, snoring loudly, gasping or stopping of breathing, trouble sleeping, and occasionally morning headaches or heartburn at night.  Sleepiness can be serious and even increase the risk of falling asleep while driving. Other health consequences may include development of high blood pressure and other cardiovascular disease in persons who are susceptible. Untreated CHAR  can contribute to heart disease, stroke and diabetes.   3. What are the treatment options? In most situations, sleep apnea is a lifelong disease that must be managed with daily therapy. Medications are not effective for sleep apnea and surgery is generally not considered until other therapies have been tried. Your treatment is your choice . Continuous Positive Airway (CPAP) works right away and is the therapy that is effective in nearly everyone. An oral device to hold your jaw forward is usually the next most " reliable option. Other options include postioning devices (to keep you off your back), weight loss, and surgery including a tongue pacing device. There is more detail about some of these options below.  4. Are my sleep studies covered by insurance? Although we will request verification of coverage, we advise you also check in advance of the study to ensure there is coverage.    Important tips for those choosing CPAP and similar devices  REMEMBER-IF YOU RECEIVE A CALL FROM  [phone]-->IT IS TO SETUP A DEVICE  For new devices, sign up for device ULISES to monitor your device for your followup visits  We encourage you to utilize the [employer] ulises or website ( https://Profista.[employer]/ ) to monitor your therapy progress and share the data with your healthcare team when you discuss your sleep apnea.                                                    Know your equipment:  CPAP is continuous positive airway pressure that prevents obstructive sleep apnea by keeping the throat from collapsing while you are sleeping. In most cases, the device is  smart  and can slowly self-adjusts if your throat collapses and keeps a record every day of how well you are treated-this information is available to you and your care team.  BPAP is bilevel positive airway pressure that keeps your throat open and also assists each breath with a pressure boost to maintain adequate breathing.  Special kinds of BPAP are used in patients who have inadequate breathing from lung or heart disease. In most cases, the device is  smart  and can slowly self-adjusts to assist breathing. Like CPAP, the device keeps a record of how well you are treated.  Your mask is your connection to the device. You get to choose what feels most comfortable and the staff will help to make sure if fits. Here: are some examples of the different masks that are available: Magnetic mask aids may assist with use but there are safety issues that should be addressed when  considering with magnets* ( see end of discussion).       Key points to remember on your journey with sleep apnea:  Sleep study.  PAP devices often need to be adjusted during a sleep study to show that they are effective and adjusted right.  Good tips to remember: Try wearing just the mask during a quiet time during the day so your body adapts to wearing it. A humidifier is recommended for comfort in most cases to prevent drying of your nose and throat. Allergy medication from your provider may help you if you are having nasal congestion.  Getting settled-in. It takes more than one night for most of us to get used to wearing a mask. Try wearing just the mask during a quiet time during the day so your body adapts to wearing it. A humidifier is recommended for comfort in most cases. Our team will work with you carefully on the first day and will be in contact within 4 days and again at 2 and 4 weeks for advice and remote device adjustments. Your therapy is evaluated by the device each day.   Use it every night. The more you are able to sleep naturally for 7-8 hours, the more likely you will have good sleep and to prevent health risks or symptoms from sleep apnea. Even if you use it 4 hours it helps. Occasionally all of us are unable to use a medical therapy, in sleep apnea, it is not dangerous to miss one night.   Communicate. Call our skilled team on the number provided on the first day if your visit for problems that make it difficult to wear the device. Over 2 out of 3 patients can learn to wear the device long-term with help from our team. Remember to call our team or your sleep providers if you are unable to wear the device as we may have other solutions for those who cannot adapt to mask CPAP therapy. It is recommended that you sleep your sleep provider within the first 3 months and yearly after that if you are not having problems.   Use it for your health. We encourage use of CPAP masks during daytime quiet  periods to allow your face and brain to adapt to the sensation of CPAP so that it will be a more natural sensation to awaken to at night or during naps. This can be very useful during the first few weeks or months of adapting to CPAP though it does not help medically to wear CPAP during wakefulness and  should not be used as a strategy just to meet guidelines.  Take care of your equipment. Make sure you clean your mask and tubing using directions every day and that your filter and mask are replaced as recommended or if they are not working.     *Masks with magnets:  Updated Contraindications  Masks with magnetic components are contraindicated for use by patients where they, or anyone in close physical contact while using the mask, have the following:   Active medical implants that interact with magnets (i.e., pacemakers, implantable cardioverter defibrillators (ICD), neurostimulators, cerebrospinal fluid (CSF) shunts, insulin/infusion pumps)   Metallic implants/objects containing ferromagnetic material (i.e., aneurysm clips/flow disruption devices, embolic coils, stents, valves, electrodes, implants to restore hearing or balance with implanted magnets, ocular implants, metallic splinters in the eye)  Updated Warning  Keep the mask magnets at a safe distance of at least 6 inches (150 mm) away from implants or medical devices that may be adversely affected by magnetic interference. This warning applies to you or anyone in close physical contact with your mask. The magnets are in the frame and lower headgear clips, with a magnetic field strength of up to 400mT. When worn, they connect to secure the mask but may inadvertently detach while asleep.  Implants/medical devices, including those listed within contraindications, may be adversely affected if they change function under external magnetic fields or contain ferromagnetic materials that attract/repel to magnetic fields (some metallic implants, e.g., contact lenses  with metal, dental implants, metallic cranial plates, screws, austin hole covers, and bone substitute devices). Consult your physician and  of your implant / other medical device for information on the potential adverse effects of magnetic fields.    BESIDES CPAP, WHAT OTHER THERAPIES ARE THERE?    Positioning Device  Positioning devices are generally used when sleep apnea is mild and only occurs on your back.This example shows a pillow that straps around the waist. It may be appropriate for those whose sleep study shows milder sleep apnea that occurs primarily when lying flat on one's back. Preliminary studies have shown benefit but effectiveness at home may need to be verified by a home sleep test. These devices are generally not covered by medical insurance.  Examples of devices that maintain sleeping on the back to prevent snoring and mild sleep apnea.    Belt type body positioner  http://[employer]/    Electronic reminder  http://nightshifttherapy.[employer]/            Oral Appliance  What is oral appliance therapy?  An oral appliance device fits on your teeth at night like a retainer used after having braces. The device is made by a specialized dentist and requires several visits over 1-2 months before a manufactured device is made to fit your teeth and is adjusted to prevent your sleep apnea. Once an oral device is working properly, snoring should be improved. A home sleep test may be recommended at that time if to determine whether the sleep apnea is adequately treated.       Some things to remember:  -Oral devices are often, but not always, covered by your medical insurance. Be sure to check with your insurance provider.   -If you are referred for oral therapy, you will be given a list of specialized dentists to consider or you may choose to visit the Web site of the American Academy of Dental Sleep Medicine  -Oral devices are less likely to work if you have severe sleep apnea or are extremely  overweight.     More detailed information  An oral appliance is a small acrylic device that fits over the upper and lower teeth  (similar to a retainer or a mouth guard). This device slightly moves jaw forward, which moves the base of the tongue forward, opens the airway, improves breathing for effective treat snoring and obstructive sleep apnea in perhaps 7 out of 10 people .  The best working devices are custom-made by a dental device  after a mold is made of the teeth 1, 2, 3.  When is an oral appliance indicated?  Oral appliance therapy is recommended as a first-line treatment for patients with primary snoring, mild sleep apnea, and for patients with moderate sleep apnea who prefer appliance therapy to use of CPAP4, 5. Severity of sleep apnea is determined by sleep testing and is based on the number of respiratory events per hour of sleep.   How successful is oral appliance therapy?  The success rate of oral appliance therapy in patients with mild sleep apnea is 75-80% while in patients with moderate sleep apnea it is 50-70%. The chance of success in patients with severe sleep apnea is 40-50%. The research also shows that oral appliances have a beneficial effect on the cardiovascular health of CHAR patients at the same magnitude as CPAP therapy7.  Oral appliances should be a second-line treatment in cases of severe sleep apnea, but if not completely successful then a combination therapy utilizing CPAP plus oral appliance therapy may be effective. Oral appliances tend to be effective in a broad range of patients although studies show that the patients who have the highest success are females, younger patients, those with milder disease, and less severe obesity. 3, 6.   Finding a dentist that practices dental sleep medicine  Specific training is available through the American Academy of Dental Sleep Medicine for dentists interested in working in the field of sleep. To find a dentist who is educated in  the field of sleep and the use of oral appliances, near you, visit the Web site of the American Academy of Dental Sleep Medicine.    References  1. Isaac, et al. Objectively measured vs self-reported compliance during oral appliance therapy for sleep-disordered breathing. Chest 2013; 144(5): 7713-4744.  2. Gio et al. Objective measurement of compliance during oral appliance therapy for sleep-disordered breathing. Thorax 2013; 68(1): 91-96.  3. Crystal et al. Mandibular advancement devices in 620 men and women with CHAR and snoring: tolerability and predictors of treatment success. Chest 2004; 125: 4801-3289.  4. Tori, et al. Oral appliances for snoring and CHAR: a review. Sleep 2006; 29: 244-262.  5. Tapan et al. Oral appliance treatment for CHAR: an update. J Clin Sleep Med 2014; 10(2): 215-227.  6. Marco et al. Predictors of OSAH treatment outcome. J Dent Res 2007; 86: 8913-1638.      Weight Loss:   Your Body mass index is 26.58 kg/m .    Being overweight does not necessarily mean you will have health consequences.  Those who have BMI over 35 or over 27 with existing medical conditions carries greater risk.   Weight loss decreases severity of sleep apnea in most people with obesity. For those with mild obesity who have developed snoring with weight gain, even 15-30 pound weight loss can improve and occasionally milder eliminate sleep apnea.  Structured and life-long dietary and health habits are necessary to lose weight and keep healthier weight levels.     The Comprehensive Weight loss program offers all aspects of weight loss strategies including two Non-Surgical Weight Loss Programs: Medical Weight Management and our 24 Week Healthy Lifestyle Program:    Medical Weight Management: You will meet with a Medical Weight Management Provider, as well as a Registered Dietician. The program may include medication therapy, dietary education, recommended exercise and physical therapy programs,  monthly support group meetings, and possible psychological counseling. Follow up visits with the provider or dietician are scheduled based on your progress and needs.    24 Week Healthy Lifestyle Program: This unique program is designed to give you the support of weekly appointments and activities thru a 24-week period. It may include all of the components of the basic program (above), with the addition of 11 individual Health  Visits, 24-week access to the [facility] website for over 700 online classes, and monthly support group meetings. This program has an out-of-pocket expense of $499 to cover the items that can not be billed to insurance (health coaches and [facility] access), and is non-refundable/non-transferable (you may be able to use a Health Savings Account; ask your HSA provider). There may be an optional meal replacement plan prescribed as well.   Surgical management achieves meaningful long-term weight loss and improvement in health risks in most patients with more severe obesity.      Sleep Apnea Surgery:    Surgery for obstructive sleep apnea is considered generally only when other therapies fail to work. Surgery may be discussed with you if you are having a difficult time tolerating CPAP and or when there is an abnormal structure that requires surgical correction.  Nose and throat surgeries often enlarge the airway to prevent collapse.  Most of these surgeries create pain for 1-2 weeks and up to half of the most common surgeries are not effective throughout life.  You should carefully discuss the benefits and drawbacks to surgery with your sleep provider and surgeon to determine if it is the best solution for you.   More information  Surgery for CHAR is directed at areas that are responsible for narrowing or complete obstruction of the airway during sleep.  There are a wide range of procedures available to enlarge and/or stabilize the airway to prevent blockage of breathing in the three major  areas where it can occur: the palate, tongue, and nasal regions.  Successful surgical treatment depends on the accurate identification of the factors responsible for obstructive sleep apnea in each person.  A personalized approach is required because there is no single treatment that works well for everyone.  Because of anatomic variation, consultation with an examination by a sleep surgeon is a critical first step in determining what surgical options are best for each patient.  In some cases, examination during sedation may be recommended in order to guide the selection of procedures.  Patients will be counseled about risks and benefits as well as the typical recovery course after surgery. Surgery is typically not a cure for a person s CHAR.  However, surgery will often significantly improve one s CHRA severity (termed  success rate ).  Even in the absence of a cure, surgery will decrease the cardiovascular risk associated with OSA7; improve overall quality of life8 (sleepiness, functionality, sleep quality, etc).      Palate Procedures:  Patients with CHAR often have narrowing of their airway in the region of their tonsils and uvula.  The goals of palate procedures are to widen the airway in this region as well as to help the tissues resist collapse.  Modern palate procedure techniques focus on tissue conservation and soft tissue rearrangement, rather than tissue removal.  Often the uvula is preserved in this procedure. Residual sleep apnea is common in patient after pharyngoplasty with an average reduction in sleep apnea events of 33%2.      Tongue Procedures:  ExamWhile patients are awake, the muscles that surround the throat are active and keep this region open for breathing. These muscles relax during sleep, allowing the tongue and other structures to collapse and block breathing.  There are several different tongue procedures available.  Selection of a tongue base procedure depends on characteristics seen on  physical exam.  Generally, procedures are aimed at removing bulky tissues in this area or preventing the back of the tongue from falling back during sleep.  Success rates for tongue surgery range from 50-62%3.    Hypoglossal Nerve Stimulation:  Hypoglossal nerve stimulation has recently received approval from the [place] Food and Drug Administration for the treatment of obstructive sleep apnea.  This is based on research showing that the system was safe and effective in treating sleep apnea6.  Results showed that the median AHI score decreased 68%, from 29.3 to 9.0. This therapy uses an implant system that senses breathing patterns and delivers mild stimulation to airway muscles, which keeps the airway open during sleep.  The system consists of three fully implanted components: a small generator (similar in size to a pacemaker), a breathing sensor, and a stimulation lead.  Using a small handheld remote, a patient turns the therapy on before bed and off upon awakening.    Candidates for this device must be greater than 18 years of age, have moderate to severe obstructive sleep apnea with less than 25% central events  (AHI between 15-65), BMI less than 35, have tried CPAP/oral appliance for at least 8 weeks without success, and have appropriate upper airway anatomy (determined by a sleep endoscopy performed by Dr. Aureliano Richmond or Dr. Dru Pan).    Nasal Procedures:  Nasal obstruction can interfere with nasal breathing during the day and night.  Studies have shown that relief of nasal obstruction can improve the ability of some patients to tolerate positive airway pressure therapy for obstructive sleep apnea1.  Treatment options include medications such as nasal saline, topical corticosteroid and antihistamine sprays, and oral medications such as antihistamines or decongestants. Non-surgical treatments can include external nasal dilators for selected patients. If these are not successful by themselves,  surgery can improve the nasal airway either alone or in combination with these other options.        Combination Procedures:  Combination of surgical procedures and other treatments may be recommended, particularly if patients have more than one area of narrowing or persistent positional disease.  The success rate of combination surgery ranges from 66-80%2,3.    References  Reynaldo SANTIAGO. The Role of the Nose in Snoring and Obstructive Sleep Apnoea: An Update.  Eur Arch Otorhinolaryngol. 2011; 268: 1365-73.   Sherri SM; Rosibel JA; Amrit JR; Pallanch JF; Delisa MB; Cody SG; Michael WHITE. Surgical modifications of the upper airway for obstructive sleep apnea in adults: a systematic review and meta-analysis. SLEEP 2010;33(10):6785-0942. Maura WILLS. Hypopharyngeal surgery in obstructive sleep apnea: an evidence-based medicine review.  Arch Otolaryngol Head Neck Surg. 2006 Feb;132(2):206-13.  Osvaldo YH1, Omi Y, Anthony FANTASMA. The efficacy of anatomically based multilevel surgery for obstructive sleep apnea. Otolaryngol Head Neck Surg. 2003 Oct;129(4):327-35.  Maura WILLS, Goldberg A. Hypopharyngeal Surgery in Obstructive Sleep Apnea: An Evidence-Based Medicine Review. Arch Otolaryngol Head Neck Surg. 2006 Feb;132(2):206-13.  Shirley MCKEON et al. Upper-Airway Stimulation for Obstructive Sleep Apnea.  N Engl J Med. 2014 Jan 9;370(2):139-49.  Sharee Y et al. Increased Incidence of Cardiovascular Disease in Middle-aged Men with Obstructive Sleep Apnea. Am J Respir Crit Care Med; 2002 166: 159-165  Vazquez EM et al. Studying Life Effects and Effectiveness of Palatopharyngoplasty (SLEEP) study: Subjective Outcomes of Isolated Uvulopalatopharyngoplasty. Otolaryngol Head Neck Surg. 2011; 144: 623-631.        WHAT IF I ONLY HAVE SNORING?    Mandibular advancement devices, lateral sleep positioning, long-term weight loss and treatment of nasal allergies have been shown to improve snoring.  Exercising tongue muscles with a game  (https://[employer]Mesh.Invent[employer]/us/ulises/soundly-reduce-snoring/li0198499778) or stimulating the tongue during the day with a device (https://doi.org/10.3390/jrl86449602) have improved snoring in some individuals.  https://www.9SLIDES.[employer]/  https://www.sleepfoundation.org/best-anti-snoring-mouthpieces-and-mouthguards    Remember to Drive Safe... Drive Alive     Sleep health profoundly affects your health, mood, and your safety.  Thirty three percent of the population (one in three of us) is not getting enough sleep and many have a sleep disorder. Not getting enough sleep or having an untreated / undertreated sleep condition may make us sleepy without even knowing it. In fact, our driving could be dramatically impaired due to our sleep health. As your provider, here are some things I would like you to know about driving:     Here are some warning signs for impairment and dangerous drowsy driving:              -Having been awake more than 16 hours               -Looking tired               -Eyelid drooping              -Head nodding (it could be too late at this point)              -Driving for more than 30 minutes     Some things you could do to make the driving safer if you are experiencing some drowsiness:              -Stop driving and rest              -Call for transportation              -Make sure your sleep disorder is adequately treated     Some things that have been shown NOT to work when experiencing drowsiness while driving:              -Turning on the radio              -Opening windows              -Eating any  distracting  /  entertaining  foods (e.g., sunflower seeds, candy, or any other)              -Talking on the phone      Your decision may not only impact your life, but also the life of others. Please, remember to drive safe for yourself and all of us.           Your Body mass index is 26.58 kg/m .  Weight management is a personal decision.  If you are interested in exploring weight loss strategies,  the following discussion covers the approaches that may be successful. Body mass index (BMI) is one way to tell whether you are at a healthy weight, overweight, or obese. It measures your weight in relation to your height.  A BMI of 18.5 to 24.9 is in the healthy range. A person with a BMI of 25 to 29.9 is considered overweight, and someone with a BMI of 30 or greater is considered obese. More than two-thirds of American adults are considered overweight or obese.  Being overweight or obese increases the risk for further weight gain. Excess weight may lead to heart disease and diabetes.  Creating and following plans for healthy eating and physical activity may help you improve your health.  Weight control is part of healthy lifestyle and includes exercise, emotional health, and healthy eating habits. Careful eating habits lifelong are the mainstay of weight control. Though there are significant health benefits from weight loss, long-term weight loss with diet alone may be very difficult to achieve- studies show long-term success with dietary management in less than 10% of people. Attaining a healthy weight may be especially difficult to achieve in those with severe obesity. In some cases, medications, devices and surgical management might be considered.  What can you do?  If you are overweight or obese and are interested in methods for weight loss, you should discuss this with your provider.   Consider reducing daily calorie intake by 500 calories.   Keep a food journal.   Avoiding skipping meals, consider cutting portions instead.    Diet combined with exercise helps maintain muscle while optimizing fat loss. Strength training is particularly important for building and maintaining muscle mass. Exercise helps reduce stress, increase energy, and improves fitness. Increasing exercise without diet control, however, may not burn enough calories to loose weight.     Start walking three days a week 10-20 minutes at a  time  Work towards walking thirty minutes five days a week   Eventually, increase the speed of your walking for 1-2 minutes at time    In addition, we recommend that you review healthy lifestyles and methods for weight loss available through the National Institutes of Health patient information sites:  http://win.niddk.nih.gov/publications/index.htm    And look into health and wellness programs that may be available through your health insurance provider, employer, local community center, or [employer].

## 2024-12-31 NOTE — NURSING NOTE
Current patient location: [address] [place] [address]    Is the patient currently in the state of [place]? YES    Visit mode:VIDEO    If the visit is dropped, the patient can be reconnected by:VIDEO VISIT: Send to e-mail at: [email or web address]    Will anyone else be joining the visit? NO  (If patient encounters technical issues they should call [phone]    Are changes needed to the allergy or medication list? No    Are refills needed on medications prescribed by this physician? NO    Rooming Documentation:  Questionnaire(s) completed    Reason for visit: Consult    Aamir PONCE

## 2025-04-02 ASSESSMENT — SLEEP AND FATIGUE QUESTIONNAIRES
HOW LIKELY ARE YOU TO NOD OFF OR FALL ASLEEP WHILE SITTING AND TALKING TO SOMEONE: WOULD NEVER DOZE
HOW LIKELY ARE YOU TO NOD OFF OR FALL ASLEEP WHILE WATCHING TV: SLIGHT CHANCE OF DOZING
HOW LIKELY ARE YOU TO NOD OFF OR FALL ASLEEP WHILE SITTING INACTIVE IN A PUBLIC PLACE: SLIGHT CHANCE OF DOZING
HOW LIKELY ARE YOU TO NOD OFF OR FALL ASLEEP WHILE SITTING AND READING: SLIGHT CHANCE OF DOZING
HOW LIKELY ARE YOU TO NOD OFF OR FALL ASLEEP IN A CAR, WHILE STOPPED FOR A FEW MINUTES IN TRAFFIC: WOULD NEVER DOZE
HOW LIKELY ARE YOU TO NOD OFF OR FALL ASLEEP WHILE LYING DOWN TO REST IN THE AFTERNOON WHEN CIRCUMSTANCES PERMIT: MODERATE CHANCE OF DOZING
HOW LIKELY ARE YOU TO NOD OFF OR FALL ASLEEP WHEN YOU ARE A PASSENGER IN A CAR FOR AN HOUR WITHOUT A BREAK: HIGH CHANCE OF DOZING
HOW LIKELY ARE YOU TO NOD OFF OR FALL ASLEEP WHILE SITTING QUIETLY AFTER LUNCH WITHOUT ALCOHOL: SLIGHT CHANCE OF DOZING

## 2025-04-06 ENCOUNTER — THERAPY VISIT (OUTPATIENT)
Dept: SLEEP MEDICINE | Facility: CLINIC | Age: 45
End: 2025-04-06
Attending: PHYSICIAN ASSISTANT
Payer: COMMERCIAL

## 2025-04-06 DIAGNOSIS — R06.00 DYSPNEA AND RESPIRATORY ABNORMALITY: ICD-10-CM

## 2025-04-06 DIAGNOSIS — F45.8 BRUXISM: ICD-10-CM

## 2025-04-06 DIAGNOSIS — Z72.820 LACK OF ADEQUATE SLEEP: ICD-10-CM

## 2025-04-06 DIAGNOSIS — R53.81 MALAISE AND FATIGUE: ICD-10-CM

## 2025-04-06 DIAGNOSIS — G47.09 OTHER INSOMNIA: Chronic | ICD-10-CM

## 2025-04-06 DIAGNOSIS — R53.83 MALAISE AND FATIGUE: ICD-10-CM

## 2025-04-06 DIAGNOSIS — R51.9 MORNING HEADACHE: ICD-10-CM

## 2025-04-06 DIAGNOSIS — R06.89 DYSPNEA AND RESPIRATORY ABNORMALITY: ICD-10-CM

## 2025-04-16 PROBLEM — F51.04 CHRONIC INSOMNIA: Status: ACTIVE | Noted: 2024-12-31

## 2025-04-19 ENCOUNTER — HEALTH MAINTENANCE LETTER (OUTPATIENT)
Age: 45
End: 2025-04-19

## 2025-06-23 ENCOUNTER — VIRTUAL VISIT (OUTPATIENT)
Dept: SLEEP MEDICINE | Facility: CLINIC | Age: 45
End: 2025-06-23
Payer: COMMERCIAL

## 2025-06-23 VITALS — BODY MASS INDEX: 26.66 KG/M2 | HEIGHT: 69 IN | WEIGHT: 180 LBS

## 2025-06-23 DIAGNOSIS — F51.04 CHRONIC INSOMNIA: ICD-10-CM

## 2025-06-23 DIAGNOSIS — G47.61 PERIODIC LIMB MOVEMENT DISORDER: Primary | ICD-10-CM

## 2025-06-23 PROCEDURE — 1126F AMNT PAIN NOTED NONE PRSNT: CPT | Mod: 95 | Performed by: PHYSICIAN ASSISTANT

## 2025-06-23 PROCEDURE — 98006 SYNCH AUDIO-VIDEO EST MOD 30: CPT | Performed by: PHYSICIAN ASSISTANT

## 2025-06-23 RX ORDER — CETIRIZINE HYDROCHLORIDE 10 MG/1
10 TABLET ORAL DAILY
COMMUNITY

## 2025-06-23 ASSESSMENT — SLEEP AND FATIGUE QUESTIONNAIRES
HOW LIKELY ARE YOU TO NOD OFF OR FALL ASLEEP WHILE SITTING AND READING: MODERATE CHANCE OF DOZING
HOW LIKELY ARE YOU TO NOD OFF OR FALL ASLEEP WHILE SITTING INACTIVE IN A PUBLIC PLACE: WOULD NEVER DOZE
HOW LIKELY ARE YOU TO NOD OFF OR FALL ASLEEP WHILE SITTING QUIETLY AFTER LUNCH WITHOUT ALCOHOL: SLIGHT CHANCE OF DOZING
HOW LIKELY ARE YOU TO NOD OFF OR FALL ASLEEP WHILE LYING DOWN TO REST IN THE AFTERNOON WHEN CIRCUMSTANCES PERMIT: HIGH CHANCE OF DOZING
HOW LIKELY ARE YOU TO NOD OFF OR FALL ASLEEP WHILE SITTING AND TALKING TO SOMEONE: WOULD NEVER DOZE
HOW LIKELY ARE YOU TO NOD OFF OR FALL ASLEEP WHEN YOU ARE A PASSENGER IN A CAR FOR AN HOUR WITHOUT A BREAK: HIGH CHANCE OF DOZING
HOW LIKELY ARE YOU TO NOD OFF OR FALL ASLEEP WHILE WATCHING TV: SLIGHT CHANCE OF DOZING
HOW LIKELY ARE YOU TO NOD OFF OR FALL ASLEEP IN A CAR, WHILE STOPPED FOR A FEW MINUTES IN TRAFFIC: WOULD NEVER DOZE

## 2025-06-23 ASSESSMENT — PAIN SCALES - GENERAL: PAINLEVEL_OUTOF10: NO PAIN (0)

## 2025-06-23 NOTE — NURSING NOTE
Current patient location: [facility] [place] [place] [address]    Is the patient currently in the state of [place]? YES    Visit mode: VIDEO    If the visit is dropped, the patient can be reconnected by:VIDEO VISIT: Text to cell phone:   Telephone Information:   Mobile [phone]       Will anyone else be joining the visit? NO  (If patient encounters technical issues they should call [phone]    Are changes needed to the allergy or medication list? Pt stated no changes to allergies and Pt stated no med changes    Are refills needed on medications prescribed by this physician? NO    Rooming Documentation:  Questionnaire(s) completed    Reason for visit: RECHECK    Gela Sevilla VVF

## 2025-06-23 NOTE — PROGRESS NOTES
Virtual Visit Details    Type of service:  Video Visit     Originating Location (pt. Location): Home    Distant Location (provider location):  On-site  Platform used for Video Visit: [facility]    Sleep Study Follow-Up Visit:    Date on this visit: 6/23/2025    Sofia Diaz comes in today for follow-up of her sleep study done on 4/6/2025 at the [facility]. A diagnostic polysomnogram was performed to evaluate for loud snoring, bruxism, non-refreshing sleep, morning headaches, daytime fatigue/sleepiness (ESS 6) and difficulty maintaining sleep.     Polysomnogram as interpreted by Dr. Marsh-     Sleep Architecture:    The total recording time of the polysomnogram was 473.5 minutes. The total sleep time was 391.5 minutes. Sleep latency was 46.5 minutes while reading for 400 minutes with the use of a sleep aid (amitriptyline). REM latency was 265.0 minutes. Arousal index was increased at 50.7 arousals per hour. Sleep efficiency was normal at 82.7%. Wake after sleep onset was 35.5 minutes. The patient spent 9.5% of total sleep time in Stage N1, 54.8% in Stage N2, 17.8% in Stage N3, and 18.0% in REM. Time in REM supine was 0 minutes.     Respiration:   Events ? The polysomnogram revealed a presence of 2 obstructive, 2 central, and 0 mixed apneas resulting in an apnea index of 0.6 events per hour. There were 15 obstructive hypopneas and 0 central hypopneas resulting in an obstructive hypopnea index of 2.3 and central hypopnea index of - events per hour. The combined apnea/hypopnea index was 2.9 events per hour (central apnea/hypopnea index was 0.3 events per hour). The REM AHI was 1.7 events per hour. The supine AHI was 8.0 events per hour. The RERA index was 2.0 events per hour.  The RDI was 4.9 events per hour.  Snoring - was reported as mild  Respiratory rate and pattern - was notable for normal respiratory rate and pattern.  Sustained Sleep Associated Hypoventilation - Transcutaneous carbon  dioxide monitoring was not used, however significant hypoventilation was not suggested by oximetry   Sleep Associated Hypoxemia - (Greater than 5 minutes O2 sat at or below 88%) was not present. Baseline oxygen saturation was 94.6%. Lowest oxygen saturation was 87.0%. Time spent less than or equal to 88% was 0.5 minutes. Time spent less than or equal to 89% was 1.5 minutes.     Movement Activity:    Periodic Limb Activity - There were 125 PLMs during the entire study. The PLM index was 19.2 movements per hour. The PLM Arousal Index was 15.6 per hour.  REM EMG Activity - Excessive transient/sustained muscle activity was not present.  Nocturnal Behavior - Abnormal sleep related behaviors were not noted.  Bruxism - None apparent.    Cardiac Summary:   The average pulse rate was 78.2 bpm. The minimum pulse rate was 62.0 bpm while the maximum pulse rate was 101.0 bpm.  Arrhythmias were not noted.    These findings were reviewed with patient.     Past medical/surgical history, family history, social history, medications and allergies were reviewed.      Problem List:  Patient Active Problem List    Diagnosis Date Noted    Chronic insomnia 12/31/2024     Priority: Medium    Globus sensation 12/29/2024     Priority: Medium    Schatzki's ring 02/09/2021     Priority: Medium     Dilatation procedure several years ago.      Uses intrauterine device for birth control 02/05/2019     Priority: Medium    Internal hemorrhoids 07/06/2015     Priority: Medium    TMJ syndrome 08/13/2012     Priority: Medium    Constipation 03/09/2009     Priority: Medium     Constipation  NOS      Dysfunction of eustachian tube 03/09/2009     Priority: Medium     Eustachian Tube Dysfunction      Nontoxic multinodular goiter 07/11/2006     Priority: Medium        Impression/Plan:  The patient's sleep disordered breathing did not reach a level of clinical significance with AHI of  3 and RDI 4.9 events per hour. This was a good study that included time  spent in REM sleep.   Patient had elevated periodic limp movements during the study. Will check ferritin. Will communicate results and or Gabapentin/Ropinirole.     She will follow up with me as needed.     30 minutes spent on day of encounter doing chart review,  history and exam, counseling, coordinating plan of care, documentation and further activities as noted above.       Jorge Mercado PA-C  Sleep Medicine

## 2025-07-17 ENCOUNTER — TELEPHONE (OUTPATIENT)
Dept: SLEEP MEDICINE | Facility: CLINIC | Age: 45
End: 2025-07-17
Payer: COMMERCIAL